# Patient Record
Sex: MALE | Race: WHITE | Employment: STUDENT | URBAN - METROPOLITAN AREA
[De-identification: names, ages, dates, MRNs, and addresses within clinical notes are randomized per-mention and may not be internally consistent; named-entity substitution may affect disease eponyms.]

---
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
## 2024-07-07 ENCOUNTER — HOSPITAL ENCOUNTER (EMERGENCY)
Age: 19
Discharge: HOME | End: 2024-07-07
Attending: EMERGENCY MEDICINE
Payer: COMMERCIAL

## 2024-07-07 VITALS
TEMPERATURE: 99 F | HEART RATE: 94 BPM | SYSTOLIC BLOOD PRESSURE: 143 MMHG | WEIGHT: 125 LBS | HEIGHT: 71 IN | BODY MASS INDEX: 17.5 KG/M2 | DIASTOLIC BLOOD PRESSURE: 90 MMHG | OXYGEN SATURATION: 99 % | RESPIRATION RATE: 18 BRPM

## 2024-07-07 DIAGNOSIS — R11.0 NAUSEA: Primary | ICD-10-CM

## 2024-07-07 PROCEDURE — 99282 EMERGENCY DEPT VISIT SF MDM: CPT

## 2024-07-07 NOTE — ED PROVIDER NOTES
Patient Seen in: [facility] In [place]      History     Chief Complaint   Patient presents with    Other     Stated Complaint: pt feels \"less\" like someone took 75% of his strength away 20 min pta, feels ok*    Subjective:   HPI    18-year-old male presenting to the emergency department for feeling.  Patient was up at 4:00 this morning he says he normally is he was on his phone when he got up and is just did not feel right.  He says he felt less than pulse ox 75% he was taking while he was there but he was little hazy.  Felt a little nauseous 2.  He says that it seemed to come and go this feeling he was in the basement father took mild the basement he was down in the basement as well did not have any of the symptoms or symptoms or headaches and there is been no further stents been on.  Patient has had no chest pain shortness of breath fevers chills cough or cold abdominal pain black or bloody open urinary complaints been no COVID or testicle complaints of chest pain there is no family history of neurologic diseases no family history of cardiac disease at young age no other exacerbating relieving factors or associated symptoms    Objective:   History reviewed. No pertinent past medical history.           Past Surgical History:   Procedure Laterality Date    Appendectomy  06/23/2013                Social History     Socioeconomic History    Marital status: Single   Tobacco Use    Smoking status: Passive Smoke Exposure - Never Smoker    Smokeless tobacco: Never   Substance and Sexual Activity    Alcohol use: No    Drug use: No   Other Topics Concern    Caffeine Concern No    Exercise Yes    Bike Helmet Yes    Seat Belt Yes              Review of Systems    Positive for stated Chief Complaint: Other    Other systems are as noted in HPI.  Constitutional and vital signs reviewed.      All other systems reviewed and negative except as noted above.    Physical Exam     ED Triage Vitals [07/07/24 0400]    /90   Pulse 94   Resp 18   Temp 99.2 °F (37.3 °C)   Temp src Oral   SpO2 99 %   O2 Device None (Room air)       Current Vitals:   Vital Signs  BP: 143/90  Pulse: 94  Resp: 18  Temp: 99.2 °F (37.3 °C)  Temp src: Oral    Oxygen Therapy  SpO2: 99 %  O2 Device: None (Room air)            Physical Exam    Awake alert patient appears no distress HEENT lungs are clear cardiovascular extremities.    Soft nontender extremities no clubbing cyanosis or edema normal gait no focal neurologic deficits    ED Course   Labs Reviewed - No data to display          Differential diagnosis includes viral syndrome         MDM                                         Medical Decision Making  18-year-old female with feeling.  Vital signs are stable there is no signs of fever no recent illness no signs of actual neurologic complaints or complaint that did not notice weakness physical manifestation of the feeling of was some slight nausea.  Patient will be discharged home and is to return emerged part worsening symptoms as scheduled related symptoms we discussed with him other potential neurologic causes such as MS but there is no family history but he I did recommend some follow-up and keeping a log of his symptoms and talk with his primary care doctor he is return to the emergency department for worsening symptoms or other complaints  The patient was screened and evaluated during this visit.  As a treating physician attending to the patient, I determined, within reasonable clinical confidence and prior to discharge, that an emergency medical condition was not or was no longer present.  There was no indication for further evaluation, treatment or admission on an emergency basis.    The usual and customary discharge instructions were discussed given the patient's ER course.  We discussed signs and symptoms that should prompt the patient's immediate return to the emergency department.  Reasonable over-the-counter and prescription  treatment options and physician follow-up plan was discussed.  Patient was discharged home in good condition  This note was prepared using Dragon Medical voice recognition dictation software.  As a result errors may occur.  When identified to these areas have been corrected.  While every attempt is made to correct errors during dictation discrepancies may still exist.  Please contact if there are any errors    Problems Addressed:  Nausea: acute illness or injury        Disposition and Plan     Clinical Impression:  1. Nausea         Disposition:  Discharge  7/7/2024  4:19 am    Follow-up:  Mariana Paniagua MD  [address] [place] [id number]  [phone]    Follow up in 1 week(s)            Medications Prescribed:  There are no discharge medications for this patient.

## (undated) NOTE — ED AVS SNAPSHOT
Noah Alcides   MRN: [id number]    Department:  [facility] in [place]   Date of Visit:  3/20/2018           Disclosure     Insurance plans vary and the physician(s) referred by the ER may not be covered by your plan.  Please contact tell this physician (or your personal doctor if your instructions are to return to your personal doctor) about any new or lasting problems. The primary care or specialist physician will see patients referred from the [facility] Emergency Department.  Jesus Lorenzo

## (undated) NOTE — LETTER
Date: 10/18/2018    Patient Name: Asha Gonzales          To Whom it may concern: The above patient was seen at the [facility] for treatment of a medical condition.     This patient should be excused from attending school from 10/18/2018 th

## (undated) NOTE — LETTER
Date: 1/31/2018    Patient Name: Catherine Gifford          To Whom it may concern: The above patient was seen at the [facility] for treatment of a medical condition.     This patient should be excused from attending school from 1/29/18 until